# Patient Record
Sex: FEMALE | Employment: UNEMPLOYED | URBAN - METROPOLITAN AREA
[De-identification: names, ages, dates, MRNs, and addresses within clinical notes are randomized per-mention and may not be internally consistent; named-entity substitution may affect disease eponyms.]

---

## 2019-11-06 ENCOUNTER — HOSPITAL ENCOUNTER (OUTPATIENT)
Dept: LAB | Age: 24
Discharge: HOME | End: 2019-11-06
Payer: COMMERCIAL

## 2019-11-06 PROCEDURE — 88175 CYTOPATH C/V AUTO FLUID REDO: CPT

## 2020-07-15 ENCOUNTER — HOSPITAL ENCOUNTER (OUTPATIENT)
Dept: PHYSICAL THERAPY | Age: 25
Discharge: HOME | End: 2020-07-15
Payer: COMMERCIAL

## 2020-07-15 PROCEDURE — 97112 NEUROMUSCULAR REEDUCATION: CPT

## 2020-07-15 PROCEDURE — 97161 PT EVAL LOW COMPLEX 20 MIN: CPT

## 2020-07-15 PROCEDURE — 97530 THERAPEUTIC ACTIVITIES: CPT

## 2020-07-15 NOTE — PROGRESS NOTES
[address] [facility] PHYSICAL THERAPY  [address] [facility], [address] - Phone: [phone]  Fax: [phone] / [address] [facility]  Patient Name: Tamra Morales : 1995   Medical   Diagnosis: Upper back pain [M54.9] Treatment Diagnosis: Upper Cross Syndrome   Onset Date: Chronic, exacerbated by post partum activities     Referral Source: Catarino Cheung NP Start of Care [facility]): 7/15/2020   Prior Hospitalization: See medical history Provider #: [id number]   Prior Level of Function: Chronic upper and lower back pain   Comorbidities: Depression, Overweight   Medications: Verified on Patient Summary List   The Plan of Care and following information is based on the information from the initial evaluation.   ===========================================================================================  Assessment / key information: Patient is a 21 y.o. female presenting with CC thoracic back pain. She reports symptoms being chronic in nature but has worsened due to nursing her 5 week old son. She is now unable to tolerate static sitting and wearing a bra without worsening her symptoms. C/S ruled out via MDT, but presents with myofascial restriction throughout her T/S. Rotation to her left side is limited by 8 degrees vs her right. Pt  will benefit from physical therapist management to address her impairments (listed below),  educate her, and improve her level of function.  Thanks for your referral.   ===========================================================================================  Eval Complexity: History: HIGH Complexity :3+ comorbidities / personal factors will impact the outcome/ POC Exam:HIGH Complexity : 4+ Standardized tests and measures addressing body structure, function, activity limitation and / or participation in recreation  Presentation: LOW Complexity : Stable, uncomplicated  Clinical Decision Making:MEDIUM Complexity : FOTO score of 26-74Overall Complexity:LOW   Problem List: pain affecting function, decrease ROM, decrease strength, edema affecting function, impaired gait/ balance, decrease ADL/ functional abilitiies, decrease activity tolerance, decrease flexibility/ joint mobility and decrease transfer abilities  FOTO score: 42 indicating 58% functional disability  Treatment Plan may include any combination of the following: Therapeutic exercise, Therapeutic activities, Neuromuscular re-education, Physical agent/modality, Gait/balance training, Manual therapy, Patient education, Self Care training, Functional mobility training, Home safety training and Stair training  Patient / Family readiness to learn indicated by: asking questions, trying to perform skills and interest  Persons(s) to be included in education: patient (P)  Barriers to Learning/Limitations:no  Measures taken: n/a   Patient Goal (s): \"Nurse with less pain\"   Patient self reported health status: good  Rehabilitation Potential: good   Short Term Goals: To be accomplished in  2-3  weeks:  1. Patient to be adherent to HEP to facilitate pain control with ADL's.  2. Patient to increase left thoracic rotation to > 55 degrees to promote bed mobility and safety while driving. 3. Patient to increase uninterrupted sitting tolerance without exacerbation of symptoms to > 15' to allow her to eat meals.  Long Term Goals: To be accomplished in  4-6  weeks:  1. Patient to be Safe and Independent with HEP to self-manage/prevent symptoms after DC. 2. Patient to report > 50% improvement in tolerance towards wearing a bra  3. Patient to increase FOTO score to > 56 to indicate improved functional independence. 4. Patient to report minimal difficulty nursing  Frequency / Duration:   Patient to be seen  2  times per week for 4-6  weeks:  Patient / Caregiver education and instruction: activity modification and exercises.  We reviewed our facility's Patient Personal Responsibilities (PPR) form, particularly in regards to compliance towards her appointment time, our attendance policy, and her home exercise program. Patient was informed of possible discharge for non-compliance to our attendance policy per PPR form. We also discussed her POC as deemed appropriate by the treating therapist and physician. Patient verbalized understanding that she must show objective and functional improvement in an appropriate time frame. Patient verbalized understanding that should progress or compliance be lacking, we will contact the referring physician for further consultation to address and attempt to establish alternate treatment strategies as necessary and/or possibly discharge. Therapist Signature: Chelsie Lam \"BJ\" Nehemias Callaway, DPT, Cert. MDT, Cert. DN, Cert. SMT, Dip. Osteopractic Date: 2/50/0830   Certification Period: n/a Time: 10:24 AM   ===========================================================================================  I certify that the above Physical Therapy Services are being furnished while the patient is under my care. I agree with the treatment plan and certify that this therapy is necessary. Physician Signature:        Date:       Time:     Please sign and return to [employer] or you may fax the signed copy to [phone]. Thank you.

## 2020-07-15 NOTE — PROGRESS NOTES
PHYSICAL THERAPY - DAILY TREATMENT NOTE  Patient Name: Danna Cordero        Date: 7/15/2020  : 1995   [x]  Patient  Verified  Visit #:   1     Insurance: Payor: BLUE CROSS / Plan: [address] [place] [address] / Product Type: PPO /      In time:   9:45        Out time:   1013 Total Treatment Time (min):   43     SUBJECTIVE    Pain Level (on 0 to 10 scale):  2  / 10   Medication Changes/New allergies or changes in medical history, any new surgeries or procedures? []  No    []  Yes   If yes, update Summary List:    Subjective Functional Status/Changes:  []  No changes reported     HISTORY    Present Symptoms: Upper back pain    Present since:  High School, worsened since birth of son. [] Improving []  Unchanging []  Worsening        Commenced as a result of: posture, currently nursing and has most of the symptoms to mid thoracic right and left T4 region     or []  No apparent reason    Constant symptoms: Dull ache and soreness.       Worse:  [x] Bending [x]  Turning Trunk to left []  Lying   [] Sitting/ Rising []  Standing [] When still   []  Am/ as the day progresses/ pm []  On the move []Other:nursing, wearing a bra     Other: *    Better:   [] Bending []  Turning neck/ Trunk []  Lying   [] Sitting/ Rising []  Standing [] When still   []  Am/ as the day progresses/ pm []  On the move []Other: having good support           Disturbed sleep: [] No    [x] Yes   Pillows: 1     Sleeping Postures:  [x]  Prone []  Supine [] Toss & Turn   []  R side [] L side  [] Chair/Recliner     Surface:  [] Soft []  Firm []  Saggy     Number of previous episodes: chronic      Pertinent History: 2 children, nursing currently 6week old  SPECIFIC QUESTIONS    [] Cough []  Deep breath   [] Sneeze      Gait:  [x] Normal []  Abnormal     Medications:  [] Nil []  NSAIDS []  Analgesic   [] Steroids []  Anticoagulants [] Other:     General Health:  [] Good [x]  Fair []  Poor     Imaging:   [] Yes [x]  No      Work: Mechanical Stresses: housewife    Leisure: Mechanical Stresses:sedentary    Functional disability from present episode:sweeping/mopping floor, wearing a bra, nursing        OBJECTIVE      EXAMINATION  Posture:  Sitting  [] Good []  Fair [x]  Poor     Standing:  [] Good []  Fair []  Poor     Protruded Head:   [x] Yes []  No       Kyphosis:  [x] Red []  Acc []  Normal     Correction of posture:  [] Better [] Worse []  No effect       Other observations:      Neurological (upper and lower limb): Neurological (upper):  Myotome Level Muscle Test Nerve Reflex Sensation   C5 Deltoid (C5&C6) Axillary N/A Acromion to Lateral Epicondyle    Biceps (C5&C6) Musculocutaneous Biceps Tendon Purest patch at the axillary nerve at the middle deltoid    Rhomboid C5 Dorsal Scapular      Supraspinatus & Infraspinatus (C5&C6) Suprascapular      Teres Minor (C5&C6) Axillary     C6 Wrist Extensor Group (C6&C7) Radial Brachioradialis Lateral Forearm and the \"6\" with the fingers    ECRL/ECRB Radial      Supinator Deep Branch Radial or PIN      ECU (C6&C7) Posterior Interosseus     C7 Triceps (C7&C8) Radial Triceps Middle Finger    Extensor Indicis C6/7/8 Posterior Interosseus      FCR C7 Median     C8 Abductor Policis Brevis (O6&B5) Median Nerve Recurrent Branch N/A Medial Forearm    Flexor Digitorum Superficialis C8 Median     T1 Dorsal Interossei T1 Ulnar Nerve N/A Medial Upper Arm    Abductor Digiti Quinti T1 Ulnar Nerve       Notable Deficits from above: unremarkable    Movement loss Carrington Mod Min Nil Pain   Flexion    x    Extension    x    Rotation R   x     Rotation L   x     Other        L STRM: 35  R STRM: 45    Cervical Differential Testing:  unremarkable    Test movements:  Describe effects on present pain- During: produces, abolishes, increases, decreases, no effect, centralising, peripheralising. After: better, worse, no better, no worse, no effect, centralised, peripheralised.      Symptoms during testing Symptoms after testing Inc. ROM Dec. ROM No Effect   Pretest sx's  Sitting T/S       FLEX increase W []   []   []     Rep FLEX increase W []   []   []     EXT C NBNW []   []   []     Rep EXT C B []   []   []         Therapeutic Procedures:  Min Procedure Specifics + Rationale   n/a [x]  Patient Education (performed throughout session) [x] Review HEP    [] Progressed/Changed HEP based on:   [] proper performance and advancement of Therex/TA   [] reduction in pain level    [] increased functional capacity       [] change in directional preference   15(15) [x] Therapeutic Activity   [x]  See Flowsheet  Posture, positioning, and transfers    Rationale: To improve safety, proprioception, coordination, and efficiency with tasks   8(8) [x] Neuromuscular Re-ed   [x]  See Flowsheet  Performed repeated T/S extensions and C/S retractions throughout treatment b/w sets/reps/exercises as needed as prophylaxis and symptoms management. Rationale: increase ROM, increase strength, improve coordination, improve balance and increase proprioception  to improve the patients ability to safely participate in ADL's         Post Treatment Pain Level (on 0 to 10) scale:   1  / 10     ASSESSMENT      min Patient Education:  YES  Reviewed HEP   []  Progressed/Changed HEP based on:          ASSESSMENT    Assessment:    See POC    []  See Progress Note/Recertification   Patient will continue to benefit from skilled PT services to: See POC   Progress toward goals / Updated goals:    See POC     PLAN    []  Upgrade activities as tolerated YES Continue plan of care   []  Discharge due to :    [x]  Other: Initiate POC     Therapist: Stephane Granado \"BHAKTI\" SORAIDA Espinal, Cert. MDT, CertSunny LEA, Cert.  SMT    Date: 7/15/2020 Time: 9:49 AM

## 2020-07-21 ENCOUNTER — APPOINTMENT (OUTPATIENT)
Dept: PHYSICAL THERAPY | Age: 25
End: 2020-07-21
Payer: COMMERCIAL

## 2020-07-23 ENCOUNTER — APPOINTMENT (OUTPATIENT)
Dept: PHYSICAL THERAPY | Age: 25
End: 2020-07-23
Payer: COMMERCIAL

## 2020-07-28 ENCOUNTER — APPOINTMENT (OUTPATIENT)
Dept: PHYSICAL THERAPY | Age: 25
End: 2020-07-28
Payer: COMMERCIAL

## 2020-07-30 ENCOUNTER — APPOINTMENT (OUTPATIENT)
Dept: PHYSICAL THERAPY | Age: 25
End: 2020-07-30
Payer: COMMERCIAL

## 2020-08-04 ENCOUNTER — APPOINTMENT (OUTPATIENT)
Dept: PHYSICAL THERAPY | Age: 25
End: 2020-08-04
Payer: COMMERCIAL

## 2020-08-06 ENCOUNTER — APPOINTMENT (OUTPATIENT)
Dept: PHYSICAL THERAPY | Age: 25
End: 2020-08-06
Payer: COMMERCIAL

## 2020-08-18 ENCOUNTER — HOSPITAL ENCOUNTER (OUTPATIENT)
Dept: PHYSICAL THERAPY | Age: 25
Discharge: HOME | End: 2020-08-18
Payer: COMMERCIAL

## 2020-08-18 PROCEDURE — 97110 THERAPEUTIC EXERCISES: CPT

## 2020-08-20 ENCOUNTER — APPOINTMENT (OUTPATIENT)
Dept: PHYSICAL THERAPY | Age: 25
End: 2020-08-20
Payer: COMMERCIAL

## 2020-08-25 ENCOUNTER — HOSPITAL ENCOUNTER (OUTPATIENT)
Dept: PHYSICAL THERAPY | Age: 25
Discharge: HOME | End: 2020-08-25
Payer: COMMERCIAL

## 2020-08-25 PROCEDURE — 97110 THERAPEUTIC EXERCISES: CPT

## 2020-08-25 PROCEDURE — 97161 PT EVAL LOW COMPLEX 20 MIN: CPT
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
## 2020-08-27 ENCOUNTER — HOSPITAL ENCOUNTER (OUTPATIENT)
Dept: PHYSICAL THERAPY | Age: 25
Discharge: HOME | End: 2020-08-27
Payer: COMMERCIAL

## 2020-08-27 PROCEDURE — 97110 THERAPEUTIC EXERCISES: CPT

## 2020-08-27 NOTE — PROGRESS NOTES
PHYSICAL THERAPY - DAILY TREATMENT NOTE    Patient Name: Arsenio Bright        Date: 2020  : 1995   YES Patient  Verified  Visit #:   2   of     Insurance: Payor: Tariq Bryant / Plan: [address] [place] [address] / Product Type: PPO /      In time: 3:08 Out time: 3:48   Total Treatment Time: 40     Medicare/[facility] Time Tracking (below)   Total Timed Codes (min):  40 1:1 Treatment Time:  40     TREATMENT AREA = Upper back pain [M54.9]  Right hip pain [M25.551]    SUBJECTIVE    Pain Level (on 0 to 10 scale):  1-2   10   Medication Changes/New allergies or changes in medical history, any new surgeries or procedures? NO    If yes, update Summary List   Subjective Functional Status/Changes:  []  No changes reported     \"My hip actually feels a whole lot better.  \"          OBJECTIVE     Therapeutic Procedures:  Min Procedure Specifics + Rationale   n/a [x]  Patient Education (performed throughout session) [x] Review HEP    [] Progressed/Changed HEP based on:   [] proper performance and advancement of Therex/TA   [] reduction in pain level    [] increased functional capacity       [] change in directional preference   40(40) [x] Therapeutic Exercise   [x]  See Flowsheet   Rationale: increase ROM and increase strength to improve the patients ability to participate in ADL's          Other Objective/Functional Measures:    Reviewed HEP from initial eval  Added new therex per flow sheet     Post Treatment Pain Level (on 0 to 10) scale:   0  / 10     ASSESSMENT    Assessment/Changes in Function:       Responding well to current course of treatment as patient reports improved in mobility and pain with treatment progression          Patient will continue to benefit from skilled PT services to modify and progress therapeutic interventions, address functional mobility deficits, address ROM deficits, address strength deficits, analyze and address soft tissue restrictions, analyze and cue movement patterns, analyze and modify body mechanics/ergonomics and instruct in home and community integration  to attain remaining goals   Progress toward goals / Updated goals:    1st session since initial eval, no significant progress noted in return to function      PLAN    [x]  Upgrade activities as tolerated  [x]  Update interventions per flow sheet YES Continue plan of care   []  Discharge due to :    []  Other:      Therapist: Ted Medina \"BJ\" Teddy, DPT, Cert. MDT, Cert. DN, Cert. SMT, Dip.  Osteopractic    Date: 8/27/2020 Time: 3:13 PM     Future Appointments   Date Time Provider Anamaria Rosado   8/27/2020  3:15 PM Norman Ruiz, PT [facility]   9/1/2020  2:45 PM Norman Ruiz, PT [facility]   9/2/2020  2:45 PM Norman Ruiz, PT [facility]   9/9/2020  2:45 PM Norman Ruiz, PT [facility]   9/10/2020 12:30 PM Norman Ruiz, PT [facility]   9/15/2020  2:45 PM Opal Dion [facility]   9/17/2020  2:45 PM Norman Ruiz, PT [facility]   9/22/2020  2:45 PM Opal Dion [facility]   9/24/2020  2:45 PM Norman Ruiz, PT [facility]   9/29/2020  2:45 PM Karie Campuzano, PTA [facility]

## 2020-09-01 ENCOUNTER — APPOINTMENT (OUTPATIENT)
Dept: PHYSICAL THERAPY | Age: 25
End: 2020-09-01
Payer: COMMERCIAL

## 2020-09-01 ENCOUNTER — HOSPITAL ENCOUNTER (OUTPATIENT)
Dept: PHYSICAL THERAPY | Age: 25
Discharge: HOME | End: 2020-09-01
Payer: COMMERCIAL

## 2020-09-01 PROCEDURE — 97110 THERAPEUTIC EXERCISES: CPT

## 2020-09-01 NOTE — PROGRESS NOTES
[facility] PHYSICAL THERAPY - DAILY TREATMENT NOTE    Patient Name: Kalina Hayden        Date: 2020  : 1995   YES Patient  Verified  Visit #:   3     Insurance: Payor: Naty Smith / Plan: [address] [place] [address] / Product Type: PPO /      In time: 2:30 Out time: 3:23   Total Treatment Time: 53     Medicare/[employer] Time Tracking (below)   Total Timed Codes (min):  53 1:1 Treatment Time:  53     TREATMENT AREA = Right hip pain [M25.551]  Upper back pain [M54.9]    SUBJECTIVE    Pain Level (on 0 to 10 scale):  0  / 10   Medication Changes/New allergies or changes in medical history, any new surgeries or procedures? NO    If yes, update Summary List   Subjective Functional Status/Changes:  []  No changes reported     \"The hip isn't popping as much or as loud, and it barely hurts when it pops/snaps. \"          OBJECTIVE     Therapeutic Procedures:  Min Procedure Specifics + Rationale   n/a [x]  Patient Education (performed throughout session) [x] Review HEP    [] Progressed/Changed HEP based on:   [] proper performance and advancement of Therex/TA   [] reduction in pain level    [] increased functional capacity       [] change in directional preference   53(53) [x] Therapeutic Exercise   [x]  See Flowsheet   Rationale: increase ROM and increase strength to improve the patients ability to participate in ADL's      Modality rationale: decrease inflammation, decrease pain, increase tissue extensibility and increase muscle contraction/control to improve the patients ability to perform ADL's with greater ease       Min Type Additional Details   [x] Skin assessment post-treatment:  [x]intact [x]redness- no adverse reaction       []redness  adverse reaction:     Other Objective/Functional Measures: Added new therex per flow sheet. Increased reps/sets/resistance per flow sheet.        Post Treatment Pain Level (on 0 to 10) scale:   0  / 10     ASSESSMENT    Assessment/Changes in Function: Tolerated treatment well without complaints of progression, indicating improved functional capacity for ADL's. Patient will continue to benefit from skilled PT services to modify and progress therapeutic interventions, address functional mobility deficits, address ROM deficits, address strength deficits, analyze and address soft tissue restrictions, analyze and cue movement patterns, analyze and modify body mechanics/ergonomics and instruct in home and community integration  to attain remaining goals   Progress toward goals / Updated goals:    Progressing well in pain relief for ADLs     PLAN    [x]  Upgrade activities as tolerated  [x]  Update interventions per flow sheet YES Continue plan of care   []  Discharge due to :    []  Other:      Therapist: Layla Goddard \"BJ\" Antonio Ace, DPT, Cert. MDT, Cert. DN, Cert. SMT, Dip.  Osteopractic    Date: 9/1/2020 Time: 2:42 PM     Future Appointments   Date Time Provider Anamaria Rosado   9/1/2020  2:45 PM Kiera Makge, PT [facility]   9/9/2020  2:45 PM Kiera Le Roy, PT [facility]   9/10/2020 12:30 PM Kiera Le Roy, PT [facility]   9/15/2020  2:45 PM [place] Dryer [facility]   9/17/2020  2:45 PM Kiera Le Roy, PT [facility]   9/22/2020  2:45 PM Rupert Dryer [facility]   9/24/2020  2:45 PM Kiera Le Roy, PT [facility]   9/29/2020  2:45 PM Karie Campuzano, PTA [facility]

## 2020-09-02 ENCOUNTER — APPOINTMENT (OUTPATIENT)
Dept: PHYSICAL THERAPY | Age: 25
End: 2020-09-02
Payer: COMMERCIAL

## 2020-09-09 ENCOUNTER — APPOINTMENT (OUTPATIENT)
Dept: PHYSICAL THERAPY | Age: 25
End: 2020-09-09
Payer: COMMERCIAL

## 2020-09-10 ENCOUNTER — HOSPITAL ENCOUNTER (OUTPATIENT)
Dept: PHYSICAL THERAPY | Age: 25
Discharge: HOME | End: 2020-09-10
Payer: COMMERCIAL

## 2020-09-10 PROCEDURE — 97110 THERAPEUTIC EXERCISES: CPT

## 2020-09-10 PROCEDURE — 97140 MANUAL THERAPY 1/> REGIONS: CPT

## 2020-09-10 NOTE — PROGRESS NOTES
PHYSICAL THERAPY - DAILY TREATMENT NOTE    Patient Name: Maggie Prince        Date: 9/10/2020  : 1995   YES Patient  Verified  Visit #:   4   of     Insurance: Payor: Rudolph Zaldivar / Plan: [address] [place] [address] / Product Type: PPO /      In time: 12:15early Out time: 1:07   Total Treatment Time: 53     Medicare/[facility] Time Tracking (below)   Total Timed Codes (min):  53 1:1 Treatment Time:  53     TREATMENT AREA = Right hip pain [M25.551]  Upper back pain [M54.9]    SUBJECTIVE    Pain Level (on 0 to 10 scale):  0 hip, 2 T/S  / 10   Medication Changes/New allergies or changes in medical history, any new surgeries or procedures? NO    If yes, update Summary List   Subjective Functional Status/Changes:  []  No changes reported     \"I'm feeling pretty good in the hip. It's definitely getting stronger. But could you pop my upper back? I slept wrong on it. \"          OBJECTIVE     Therapeutic Procedures:  Min Procedure Specifics + Rationale   n/a [x]  Patient Education (performed throughout session) [x] Review HEP    [] Progressed/Changed HEP based on:   [] proper performance and advancement of Therex/TA   [] reduction in pain level    [] increased functional capacity       [] change in directional preference   45(45) [x] Therapeutic Exercise   [x]  See Flowsheet   Rationale: increase ROM and increase strength to improve the patients ability to participate in ADL's    8 [x]  Manual Therapy       [] IASTM -   [] TDN (see objective; actual needle insertion time not billed)   [x] Mid-Thoracic (T2-T9) PA Extension HVLAT in Prone  REIL with OP;  Extension mobilization in prone  Rationale: decrease pain, increase ROM, increase tissue extensibility, decrease trigger points and increase postural awareness to attain functional use and participation with ADL's  [x] Skin assessment post-treatment:  [x]intact []redness- no adverse reaction   []redness  adverse        Other Objective/Functional Measures:    Increased reps/sets/resistance per flow sheet. Added new therex per flow sheet. Post Treatment Pain Level (on 0 to 10) scale:   0  / 10     ASSESSMENT    Assessment/Changes in Function:       Performed/participated in treatment well without complaints aside from muscular fatigue/deconditioning, indicating (+) response to current course of treatment to further improve functional status. Patient will continue to benefit from skilled PT services to modify and progress therapeutic interventions, address functional mobility deficits, address ROM deficits, address strength deficits, analyze and address soft tissue restrictions, analyze and cue movement patterns and instruct in home and community integration  to attain remaining goals   Progress toward goals / Updated goals:    Excellent progress in relief between sessions     PLAN    [x]  Upgrade activities as tolerated  [x]  Update interventions per flow sheet YES Continue plan of care   []  Discharge due to :    []  Other:      Therapist: Nii Johnson \"BJ\" Teddy, SORAIDA, Cert. MDT, Cert. DN, Cert. SMT, Dip.  Osteopractic    Date: 9/10/2020 Time: 12:23 PM     Future Appointments   Date Time Provider Anamaria Rosado   9/10/2020 12:30 PM Parviz Lamas PT [facility]   9/15/2020  2:45 PM Doloris Bump [facility]   9/17/2020  2:45 PM Parviz Lamas, PT [facility]   9/22/2020  2:45 PM Doloris Bump [facility]   9/24/2020  2:45 PM Parviz Lamas PT [facility]   9/29/2020  2:45 PM Karie Campuzano, PTA [facility]

## 2020-09-15 ENCOUNTER — APPOINTMENT (OUTPATIENT)
Dept: PHYSICAL THERAPY | Age: 25
End: 2020-09-15
Payer: COMMERCIAL

## 2020-09-17 ENCOUNTER — APPOINTMENT (OUTPATIENT)
Dept: PHYSICAL THERAPY | Age: 25
End: 2020-09-17
Payer: COMMERCIAL

## 2020-09-22 ENCOUNTER — APPOINTMENT (OUTPATIENT)
Dept: PHYSICAL THERAPY | Age: 25
End: 2020-09-22
Payer: COMMERCIAL

## 2020-09-24 ENCOUNTER — APPOINTMENT (OUTPATIENT)
Dept: PHYSICAL THERAPY | Age: 25
End: 2020-09-24
Payer: COMMERCIAL

## 2020-09-24 NOTE — PROGRESS NOTES
[address]  [facility] PHYSICAL THERAPY  [address] [place] [address] Brittaney Barton, [address] - Phone: [phone]  Fax: [phone]      Dear Antonio Schwab NP,   Per your referral, Shasha Ann, 1995, was evaluated and treated for the diagnosis of Right hip pain [M25.551]  Upper back pain [M54.9]. The patient was scheduled for 12 visits after her initial evaluation. She attended 4 of her follow up sessions for her hip, 6 for her upper back, and was last seen on 9/10/2020. She has missed 4 sessions due to , and when contacted she requested to self DC at this time due to  and insurance issues. We appreciate the kind referral and would willingly work with this patient again, should she be able to arrange regular attendance and is appropriate for further treatment. Your patient's health is our primary concern. If you have any questions/comments please contact us directly at [phone]. Thank you for allowing us to assist in the care of your patient. Therapist Signature: Angela Maldonado DPT, Cert. MDT, Cert. DN, Cert. SMT, Dip. Osteopractic Date: 4/30/6698   Certification Period n/a Time: 8:24 AM   Reporting Period n/a     NOTE TO PHYSICIAN:  PLEASE COMPLETE THE ORDERS BELOW AND FAX TO   [facility] Physical Therapy: [phone]  If you are unable to process this request in 24 hours please contact our office: [phone]    ___ I have read the above report and request that my patient continue as recommended.   ___ I have read the above report and request that my patient continue therapy with the following changes/special instructions:_________________________________________________________   ___ I have read the above report and request that my patient be discharged from therapy.      Physician Signature:        Date:       Time:

## 2020-09-29 ENCOUNTER — APPOINTMENT (OUTPATIENT)
Dept: PHYSICAL THERAPY | Age: 25
End: 2020-09-29
Payer: COMMERCIAL